# Patient Record
Sex: MALE | Race: OTHER
[De-identification: names, ages, dates, MRNs, and addresses within clinical notes are randomized per-mention and may not be internally consistent; named-entity substitution may affect disease eponyms.]

---

## 2019-12-31 ENCOUNTER — HOSPITAL ENCOUNTER (EMERGENCY)
Dept: HOSPITAL 12 - ER | Age: 34
LOS: 1 days | Discharge: HOME | End: 2020-01-01
Payer: COMMERCIAL

## 2019-12-31 VITALS — BODY MASS INDEX: 23.62 KG/M2 | WEIGHT: 165 LBS | HEIGHT: 70 IN
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
## 2019-12-31 DIAGNOSIS — S51.811A: Primary | ICD-10-CM
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
## 2019-12-31 PROCEDURE — 99283 EMERGENCY DEPT VISIT LOW MDM: CPT

## 2019-12-31 PROCEDURE — 12002 RPR S/N/AX/GEN/TRNK2.6-7.5CM: CPT

## 2019-12-31 PROCEDURE — 73090 X-RAY EXAM OF FOREARM: CPT

## 2019-12-31 PROCEDURE — A4663 DIALYSIS BLOOD PRESSURE CUFF: HCPCS

## 2019-12-31 PROCEDURE — A4217 STERILE WATER/SALINE, 500 ML: HCPCS

## 2020-01-01 VITALS — SYSTOLIC BLOOD PRESSURE: 138 MMHG | DIASTOLIC BLOOD PRESSURE: 87 MMHG

## 2020-01-01 NOTE — NUR
Patient discharged to home in stable conditon.  Written and verbal after care 
instructions given. 

Patient verbalizes understanding of instructions.

AMBULATORY W/ STABLE GAIT

ALL BELONGINGS W/ PT

R FOREARM DRESSED